# Patient Record
Sex: MALE | Race: WHITE | NOT HISPANIC OR LATINO | Employment: FULL TIME | URBAN - METROPOLITAN AREA
[De-identification: names, ages, dates, MRNs, and addresses within clinical notes are randomized per-mention and may not be internally consistent; named-entity substitution may affect disease eponyms.]

---

## 2019-08-28 ENCOUNTER — CLINICAL SUPPORT (OUTPATIENT)
Dept: PULMONOLOGY | Facility: CLINIC | Age: 29
End: 2019-08-28
Payer: COMMERCIAL

## 2019-08-28 ENCOUNTER — HOSPITAL ENCOUNTER (OUTPATIENT)
Dept: RADIOLOGY | Facility: HOSPITAL | Age: 29
Discharge: HOME | End: 2019-08-28
Attending: INTERNAL MEDICINE
Payer: COMMERCIAL

## 2019-08-28 ENCOUNTER — OFFICE VISIT (OUTPATIENT)
Dept: PULMONOLOGY | Facility: CLINIC | Age: 29
End: 2019-08-28
Payer: COMMERCIAL

## 2019-08-28 VITALS
HEIGHT: 69 IN | WEIGHT: 174.81 LBS | HEART RATE: 99 BPM | OXYGEN SATURATION: 97 % | DIASTOLIC BLOOD PRESSURE: 82 MMHG | RESPIRATION RATE: 16 BRPM | SYSTOLIC BLOOD PRESSURE: 124 MMHG | BODY MASS INDEX: 25.89 KG/M2

## 2019-08-28 DIAGNOSIS — J45.909 ASTHMA, UNSPECIFIED ASTHMA SEVERITY, UNSPECIFIED WHETHER COMPLICATED, UNSPECIFIED WHETHER PERSISTENT: Primary | ICD-10-CM

## 2019-08-28 DIAGNOSIS — J45.20 MILD INTERMITTENT ASTHMA, UNSPECIFIED WHETHER COMPLICATED: Primary | ICD-10-CM

## 2019-08-28 DIAGNOSIS — J45.20 MILD INTERMITTENT ASTHMA, UNSPECIFIED WHETHER COMPLICATED: ICD-10-CM

## 2019-08-28 DIAGNOSIS — J45.909 ASTHMA, UNSPECIFIED ASTHMA SEVERITY, UNSPECIFIED WHETHER COMPLICATED, UNSPECIFIED WHETHER PERSISTENT: ICD-10-CM

## 2019-08-28 LAB
BRPFT: ABNORMAL
FEF 25 75 LLN: 2.69
FEF 25 75 PRE REF: 59.7 %
FEF 25 75 REF: 4.37
FEV1 FVC LLN: 72
FEV1 FVC PRE REF: 84.7 %
FEV1 FVC REF: 83
FEV1 LLN: 3.42
FEV1 PRE REF: 91 %
FEV1 REF: 4.29
FVC LLN: 4.19
FVC PRE REF: 107 %
FVC REF: 5.21
PEF LLN: 7.77
PEF PRE REF: 85.7 %
PEF REF: 10.01
PRE FEF 25 75: 2.61 L/S (ref 2.69–6.05)
PRE FET 100: 10.84 SEC
PRE FEV1 FVC: 70.03 % (ref 71.82–93.49)
PRE FEV1: 3.91 L (ref 3.42–5.17)
PRE FVC: 5.58 L (ref 4.19–6.24)
PRE PEF: 8.58 L/S (ref 7.77–12.25)

## 2019-08-28 PROCEDURE — 71046 XR CHEST PA AND LATERAL: ICD-10-PCS | Mod: 26,,, | Performed by: RADIOLOGY

## 2019-08-28 PROCEDURE — 99205 PR OFFICE/OUTPT VISIT, NEW, LEVL V, 60-74 MIN: ICD-10-PCS | Mod: 25,S$GLB,, | Performed by: INTERNAL MEDICINE

## 2019-08-28 PROCEDURE — 99999 PR PBB SHADOW E&M-NEW PATIENT-LVL III: ICD-10-PCS | Mod: PBBFAC,,, | Performed by: INTERNAL MEDICINE

## 2019-08-28 PROCEDURE — 3008F PR BODY MASS INDEX (BMI) DOCUMENTED: ICD-10-PCS | Mod: CPTII,S$GLB,, | Performed by: INTERNAL MEDICINE

## 2019-08-28 PROCEDURE — 3008F BODY MASS INDEX DOCD: CPT | Mod: CPTII,S$GLB,, | Performed by: INTERNAL MEDICINE

## 2019-08-28 PROCEDURE — 71046 X-RAY EXAM CHEST 2 VIEWS: CPT | Mod: 26,,, | Performed by: RADIOLOGY

## 2019-08-28 PROCEDURE — 99205 OFFICE O/P NEW HI 60 MIN: CPT | Mod: 25,S$GLB,, | Performed by: INTERNAL MEDICINE

## 2019-08-28 PROCEDURE — 94010 BREATHING CAPACITY TEST: CPT | Mod: S$GLB,,, | Performed by: INTERNAL MEDICINE

## 2019-08-28 PROCEDURE — 99999 PR PBB SHADOW E&M-NEW PATIENT-LVL III: CPT | Mod: PBBFAC,,, | Performed by: INTERNAL MEDICINE

## 2019-08-28 PROCEDURE — 71046 X-RAY EXAM CHEST 2 VIEWS: CPT | Mod: TC

## 2019-08-28 PROCEDURE — 94010 BREATHING CAPACITY TEST: ICD-10-PCS | Mod: S$GLB,,, | Performed by: INTERNAL MEDICINE

## 2019-08-28 RX ORDER — MONTELUKAST SODIUM 10 MG/1
10 TABLET ORAL NIGHTLY
COMMUNITY
End: 2019-08-28 | Stop reason: SDUPTHER

## 2019-08-28 RX ORDER — ALBUTEROL SULFATE 90 UG/1
AEROSOL, METERED RESPIRATORY (INHALATION)
COMMUNITY
End: 2019-08-28 | Stop reason: SDUPTHER

## 2019-08-28 RX ORDER — MONTELUKAST SODIUM 10 MG/1
10 TABLET ORAL NIGHTLY
Qty: 90 TABLET | Refills: 3 | Status: SHIPPED | OUTPATIENT
Start: 2019-08-28 | End: 2019-10-31 | Stop reason: SDUPTHER

## 2019-08-28 RX ORDER — BUDESONIDE AND FORMOTEROL FUMARATE DIHYDRATE 160; 4.5 UG/1; UG/1
2 AEROSOL RESPIRATORY (INHALATION) EVERY 12 HOURS
COMMUNITY
End: 2020-02-12 | Stop reason: SDUPTHER

## 2019-08-28 RX ORDER — ALBUTEROL SULFATE 90 UG/1
1-2 AEROSOL, METERED RESPIRATORY (INHALATION) EVERY 6 HOURS PRN
Qty: 18 G | Refills: 3 | Status: SHIPPED | OUTPATIENT
Start: 2019-08-28 | End: 2020-02-12 | Stop reason: SDUPTHER

## 2019-08-28 NOTE — PATIENT INSTRUCTIONS
Asthma Action Plan     Your name:  _________________________  Emergency contact:  _________________________  Healthcare provider:  _________________________ Today's Date:  _________________________  Phone:  _________________________  Signature:  _________________________ Next appt (date/time):  _________________________  Phone:  _________________________  Phone:  _________________________      Green zone   My symptoms What I should do My medicine   · No wheezing, coughing, or chest tightness  · Asthma is not bothering your sleep, work, or school  · You rarely or never use your quick-relief medicine  Peak flow is:     _____________________  80%-100% of personal best · Keep taking your long-term  controller medicines  · Take your quick-relief   medicines as needed  Avoid your asthma triggers (list):  __________________________     __________________________     __________________________     __________________________     __________________________ Long-term controllers:  __________________________  Name:  __________________________  Dose:  __________________________  How often:  __________________________  Special instructions:  __________________________  Quick-relief:  __________________________  __________________________  Before exercise:  __________________________      Yellow zone   My symptoms What I should do My medicine   · Some wheezing, coughing, or chest tightness  · When at rest, your breathing is a little faster than normal  · Asthma symptoms wake you up at night  Peak flow is:     ___________________________  50%-80% of personal best, or   has lessened by at least 15%     You begin to have symptoms of a respiratory infection, if infections trigger your symptoms · Keep taking your long-term controller medicines  · Use your quick-relief medicine  · If you do not feel better within an hour after using your quick-relief medicine, make sure you know what to do! You might use more medicine or use another  medicine.  · Call your healthcare provider if you are unsure Continue to take long-term controllers:  _________________________  Name:  _________________________  Dose:  _________________________  How often:  _________________________  Special instructions  _________________________     Name:  _________________________  Dose:  _________________________  How often:  _________________________  Special instructions:  _________________________  Quick-relief:  _________________________  _________________________     If your symptoms don't go away after 1 hour, take:  _________________________      Red zone   My symptoms What I should do My medicine   · Continuous wheezing, coughing, or trouble breathing  · Trouble walking or talking  · Asthma symptoms make it hard for you to sleep     Peak flow is:     __________________________  Less than 50% of personal best · Use your quick-relief medicines  · Call your healthcare provider     Call 911 if:  · It is getting harder to breathe  · You can't walk or talk  · Your lips or fingers look gray or blue Quick-relief:  __________________________  __________________________     Quick-relief:  __________________________  __________________________     Quick-relief:  __________________________  __________________________      Date Last Reviewed: 10/1/2016  © 3221-5448 [employer] [address] [place] [address], [place] [address]. All rights reserved. This information is not intended as a substitute for professional medical care. Always follow your healthcare professional's instructions.        Understanding Asthma Triggers  Triggers are things that cause you to have asthma symptoms. Some triggers you can stay away from completely. Others you can plan for and adjust to. Use this sheet to help you know your triggers.    What are triggers?  Triggers irritate your lungs and lead to asthma flare-ups. Some examples are:  · Irritants, such as tobacco smoke or air pollution. These are  a concern for all people with asthma.  · Allergens or substances that cause allergies, like pets, dust mites, or pollen  · Special conditions. These include being ill with a cold or the flu, or certain kinds of weather, including changes in weather. These differ from person to person.  · Exercise can trigger asthma in some people. If exercise is one of your triggers, you can learn how to exercise safely.  What triggers your asthma?  Which of these common triggers cause your asthma to flare up? Check all that apply to you.  Irritants:  ? Tobacco smoke (smoking or secondhand smoke)  ? Smoke from fireplaces  ? Vehicle exhaust  ? Smog or air pollution  ? Aerosol sprays  ? Strong odors, such as perfume, incense, or cooking odors  ? Household , such as ammonia or bleach  Allergens:  ? Cats  ? Dogs  ? Birds  ? Dust or dust mites  ? Pollen  ? Mold  ? Cockroaches  Other triggers:  ? Cold air  ? Hot air  ? Weather changes  ? Exercise  ? Certain foods or food ingredients (such as sulfites)  ? Medicines  ? Emotions such as laughing, crying, or feeling stressed  ? Illness such as colds, flu, and sinus infections  Allergies and allergy treatment  People with asthma often have allergies. If you have allergies, or think you have them, talk with your healthcare provider about testing and treatment. Allergy testing can find out exactly which allergens affect you. Types of tests include:  · Skin tests. A small amount of each allergen is put on the skin. Sites are then looked at for an allergic reaction. This could be redness, swelling, or itching. In general, the greater the reaction, the stronger the allergy.  · Blood tests. A blood test can show sensitivity to the allergen.  Exposing a person to gradually larger amounts of an allergen can help the body build up a tolerance. This is the purpose of allergy shots (immunotherapy). For this therapy, injections are given over a period of years. At first, you get injections with a  very small amount of allergen about once a week. As treatment goes on, the amount of allergen is gradually increased to a certain level. Eventually, you have the injections less often. This therapy can take up to a year to start working. But it can be very effective to manage certain allergies over time.   Date Last Reviewed: 10/1/2016  © 3983-1143 [employer]. [address] [place] [address]. All rights reserved. This information is not intended as a substitute for professional medical care. Always follow your healthcare professional's instructions.

## 2019-08-28 NOTE — PROGRESS NOTES
Subjective:      Patient Active Problem List   Diagnosis    Asthma       There is no immunization history on file for this patient.    Social History     Tobacco Use   Smoking Status Never Smoker   Smokeless Tobacco Never Used     Asthma Control Test  In the past 4  weeks, how much of the time did your asthma keep you from getting as much done at work, school or at home?: None of the time  During the past 4 weeks, how often have you had shortness of breath?: Once or twice a week  During the past 4 weeks, how often did your asthma symptoms (wheezing, couging, shortness of breath, chest tightness or pain) wake you up at night or earlier than usual in the morning?: Not at all  During the past 4 weeks, how often have you used your rescue inhaler or nebulizer medication (such as albuterol)?: Once a week or less  How would you rate your asthma control during the past 4 weeks?: Well controlled  If your score is 19 or less, your asthma may not be under control: 22      Patient ID: Laurent Baca is a 29 y.o. male.    Chief Complaint:  Laurent Baca is 29 y.o.   Self referral  Known childhood asthma.    Treated as Mild intermittent   Meds tried in past: Symbicort, BREO, DULERA  The worse time of his life was between ages 1-5.  Transferring care from Dr. Addison Courtney  Needs refills  His current regimen is montelukast and rescue albuterol.  Last exacerbation from asthma was more than 5 years ago  Works for [employer] in [employer]  He has seen an allergist in the past was treated for 10 years on allergy shots  No cough no wheezing no shortness of breath  Patient has not needed any IV or PO cortical steroids or inhaled cortical steroids  Chest x-ray reviewed which was clear  Patient has normal baseline spirometry.  FEV1 is 3.91 L (91% predicted )      The following portions of the patient's history were reviewed and updated as appropriate:   He  has a past medical history of Asthma.  He does not have any pertinent problems on file.  He  has  "no past surgical history on file.  His family history is not on file.  He  reports that he has never smoked. He has never used smokeless tobacco. His alcohol and drug histories are not on file.  He has a current medication list which includes the following prescription(s): albuterol, montelukast, and budesonide-formoterol 160-4.5 mcg.  Current Outpatient Medications on File Prior to Visit   Medication Sig Dispense Refill    [DISCONTINUED] albuterol (PROVENTIL/VENTOLIN HFA) 90 mcg/actuation inhaler Inhale into the lungs.      [DISCONTINUED] montelukast (SINGULAIR) 10 mg tablet Take 10 mg by mouth every evening.      budesonide-formoterol 160-4.5 mcg (SYMBICORT) 160-4.5 mcg/actuation HFAA Inhale 2 puffs into the lungs every 12 (twelve) hours. Controller       No current facility-administered medications on file prior to visit.      He is allergic to peanut extract and shellfish containing products..     Review of Systems   Constitutional: Negative.    Eyes: Negative.    Respiratory: Negative.    Cardiovascular: Negative.    Gastrointestinal: Negative.    Genitourinary: Negative.    Musculoskeletal: Negative.    Skin: Negative.    All other systems reviewed and are negative.       Objective:        Vitals:    08/28/19 1411   BP: 124/82   Pulse: 99   Resp: 16   SpO2: 97%   Weight: 79.3 kg (174 lb 13.2 oz)   Height: 5' 9.29" (1.76 m)     Physical Exam   Constitutional: He is oriented to person, place, and time. He appears well-developed and well-nourished.   HENT:   Head: Normocephalic and atraumatic.   Nose: Nose normal.   Mouth/Throat: Oropharynx is clear and moist.   Eyes: Pupils are equal, round, and reactive to light. Conjunctivae and EOM are normal.   Neck: Normal range of motion. Neck supple.   Cardiovascular: Normal rate, regular rhythm and normal heart sounds.   No murmur heard.  Pulmonary/Chest: Effort normal and breath sounds normal. No stridor. No respiratory distress. He has no wheezes. He has no rales. "   Abdominal: Soft. Bowel sounds are normal. He exhibits no distension.   Musculoskeletal: Normal range of motion. He exhibits no edema.   Neurological: He is alert and oriented to person, place, and time.   Skin: Skin is warm and dry. Capillary refill takes 2 to 3 seconds.   Psychiatric: He has a normal mood and affect.   Nursing note and vitals reviewed.      Data Review: CBC: No results found for: WBC, RBC, HGB, HCT, PLT  BMP: No results found for: GLU, NA, K, CL, CO2, BUN, CREATININE, CALCIUM  Radiology review:     Diagnostics: Chest x-ray:         PA and lateral views of the chest were performed.    COMPARISON:  None    FINDINGS:  The cardiac and mediastinal silhouettes appear within normal limits.   The lungs are clear bilaterally.  No acute osseous findings demonstrated.      Impression       No acute findings.     FEV1 3.91 L (91 % predicted) FVC 5.58 (107% predicted) FEV1/FVC 70.03    Assessment:        1. Mild intermittent asthma, unspecified whether complicated          Patient appears to be stable with normal FEV1 based on his current regimen 30 reports  Asthma score 22  Discussed with patient that preference will be to be on an inhaled corticosteroid Asmanex or QVAR however since patient is currently stable on montelukast will monitor for now    Plan:        Orders Placed This Encounter   Procedures    CBC auto differential     Standing Status:   Future     Standing Expiration Date:   10/26/2020    IgE     Standing Status:   Future     Standing Expiration Date:   10/26/2020       Requested Prescriptions     Signed Prescriptions Disp Refills    montelukast (SINGULAIR) 10 mg tablet 90 tablet 3     Sig: Take 1 tablet (10 mg total) by mouth every evening.    albuterol (PROVENTIL/VENTOLIN HFA) 90 mcg/actuation inhaler 18 g 3     Sig: Inhale 1-2 puffs into the lungs every 6 (six) hours as needed for Wheezing.     Follow up in about 1 year (around 8/28/2020), or azael today, labs follow CXR, azael next  visit, for Spirometry and CXR next visit, Sign up [facility].    This note was prepared using voice recognition system and is likely to have sound alike errors that may have been overlooked even after proof reading.  Please call me with any questions    Discussed diagnosis, its evaluation, treatment and usual course. All questions answered.    Thank you for the courtesy of participating in the care of this patient    Zeus Clark MD

## 2019-10-31 DIAGNOSIS — J45.20 MILD INTERMITTENT ASTHMA, UNSPECIFIED WHETHER COMPLICATED: ICD-10-CM

## 2019-10-31 RX ORDER — MONTELUKAST SODIUM 10 MG/1
10 TABLET ORAL NIGHTLY
Qty: 90 TABLET | Refills: 3 | Status: SHIPPED | OUTPATIENT
Start: 2019-10-31 | End: 2020-10-30

## 2019-10-31 NOTE — TELEPHONE ENCOUNTER
----- Message from Chandni Guadalupe sent at 10/31/2019 10:58 AM CDT -----  Contact: Patient  Patient would like the nurse to give him a call back at Ph .[phone], concerning the message he left on [facility] about changing pharmacies.

## 2020-02-11 ENCOUNTER — PATIENT MESSAGE (OUTPATIENT)
Dept: PULMONOLOGY | Facility: CLINIC | Age: 30
End: 2020-02-11

## 2020-02-12 DIAGNOSIS — J45.20 MILD INTERMITTENT ASTHMA, UNSPECIFIED WHETHER COMPLICATED: Primary | ICD-10-CM

## 2020-02-12 RX ORDER — ALBUTEROL SULFATE 90 UG/1
1-2 AEROSOL, METERED RESPIRATORY (INHALATION) EVERY 6 HOURS PRN
Qty: 18 G | Refills: 3 | Status: SHIPPED | OUTPATIENT
Start: 2020-02-12

## 2020-02-12 RX ORDER — BUDESONIDE AND FORMOTEROL FUMARATE DIHYDRATE 160; 4.5 UG/1; UG/1
2 AEROSOL RESPIRATORY (INHALATION) EVERY 12 HOURS
Qty: 1 INHALER | Refills: 11 | Status: SHIPPED | OUTPATIENT
Start: 2020-02-12 | End: 2020-03-20 | Stop reason: SDUPTHER

## 2020-03-20 DIAGNOSIS — J45.20 MILD INTERMITTENT ASTHMA, UNSPECIFIED WHETHER COMPLICATED: ICD-10-CM

## 2020-03-20 RX ORDER — BUDESONIDE AND FORMOTEROL FUMARATE DIHYDRATE 160; 4.5 UG/1; UG/1
2 AEROSOL RESPIRATORY (INHALATION) EVERY 12 HOURS
Qty: 1 INHALER | Refills: 5 | Status: SHIPPED | OUTPATIENT
Start: 2020-03-20

## 2021-04-28 ENCOUNTER — PATIENT MESSAGE (OUTPATIENT)
Dept: RESEARCH | Facility: HOSPITAL | Age: 31
End: 2021-04-28

## 2021-07-01 ENCOUNTER — PATIENT MESSAGE (OUTPATIENT)
Dept: ADMINISTRATIVE | Facility: OTHER | Age: 31
End: 2021-07-01